# Patient Record
Sex: FEMALE | Race: ASIAN
[De-identification: names, ages, dates, MRNs, and addresses within clinical notes are randomized per-mention and may not be internally consistent; named-entity substitution may affect disease eponyms.]

---

## 2018-02-28 ENCOUNTER — HOSPITAL ENCOUNTER (OUTPATIENT)
Dept: HOSPITAL 92 - BICRAD | Age: 78
Discharge: HOME | End: 2018-02-28
Attending: INTERNAL MEDICINE
Payer: MEDICARE

## 2018-02-28 DIAGNOSIS — R05: Primary | ICD-10-CM

## 2018-02-28 PROCEDURE — 71046 X-RAY EXAM CHEST 2 VIEWS: CPT

## 2018-03-04 ENCOUNTER — HOSPITAL ENCOUNTER (OUTPATIENT)
Dept: HOSPITAL 92 - ERS | Age: 78
Setting detail: OBSERVATION
LOS: 3 days | Discharge: NURSING, REHAB OR LONG-TERM CARE | End: 2018-03-07
Attending: INTERNAL MEDICINE | Admitting: INTERNAL MEDICINE
Payer: MEDICARE

## 2018-03-04 VITALS — BODY MASS INDEX: 15 KG/M2

## 2018-03-04 DIAGNOSIS — E87.6: ICD-10-CM

## 2018-03-04 DIAGNOSIS — M62.82: Primary | ICD-10-CM

## 2018-03-04 DIAGNOSIS — D72.829: ICD-10-CM

## 2018-03-04 DIAGNOSIS — Z98.890: ICD-10-CM

## 2018-03-04 DIAGNOSIS — E78.5: ICD-10-CM

## 2018-03-04 DIAGNOSIS — E44.0: ICD-10-CM

## 2018-03-04 DIAGNOSIS — H40.9: ICD-10-CM

## 2018-03-04 DIAGNOSIS — Z79.899: ICD-10-CM

## 2018-03-04 LAB
ALBUMIN SERPL BCG-MCNC: 4 G/DL (ref 3.4–4.8)
ALP SERPL-CCNC: 72 U/L (ref 40–150)
ALT SERPL W P-5'-P-CCNC: 29 U/L (ref 8–55)
ANION GAP SERPL CALC-SCNC: 15 MMOL/L (ref 10–20)
AST SERPL-CCNC: 51 U/L (ref 5–34)
BASOPHILS # BLD AUTO: 0 THOU/UL (ref 0–0.2)
BASOPHILS NFR BLD AUTO: 0.5 % (ref 0–1)
BILIRUB SERPL-MCNC: 1.6 MG/DL (ref 0.2–1.2)
BUN SERPL-MCNC: 19 MG/DL (ref 9.8–20.1)
CALCIUM SERPL-MCNC: 9.7 MG/DL (ref 7.8–10.44)
CHLORIDE SERPL-SCNC: 90 MMOL/L (ref 98–107)
CK SERPL-CCNC: 917 U/L (ref 29–168)
CO2 SERPL-SCNC: 29 MMOL/L (ref 23–31)
CREAT CL PREDICTED SERPL C-G-VRATE: 0 ML/MIN (ref 70–130)
CRP SERPL-MCNC: 10.37 MG/DL
EOSINOPHIL # BLD AUTO: 0 THOU/UL (ref 0–0.7)
EOSINOPHIL NFR BLD AUTO: 0.1 % (ref 0–10)
GLOBULIN SER CALC-MCNC: 2.9 G/DL (ref 2.4–3.5)
GLUCOSE SERPL-MCNC: 121 MG/DL (ref 83–110)
HGB BLD-MCNC: 13.5 G/DL (ref 12–16)
HYALINE CASTS #/AREA URNS LPF: (no result) LPF
LYMPHOCYTES # BLD: 0.5 THOU/UL (ref 1.2–3.4)
LYMPHOCYTES NFR BLD AUTO: 6.1 % (ref 21–51)
MCH RBC QN AUTO: 31.5 PG (ref 27–31)
MCV RBC AUTO: 93.2 FL (ref 81–99)
MONOCYTES # BLD AUTO: 0.6 THOU/UL (ref 0.11–0.59)
MONOCYTES NFR BLD AUTO: 6.4 % (ref 0–10)
NEUTROPHILS # BLD AUTO: 7.7 THOU/UL (ref 1.4–6.5)
NEUTROPHILS NFR BLD AUTO: 87 % (ref 42–75)
PLATELET # BLD AUTO: 174 THOU/UL (ref 130–400)
POTASSIUM SERPL-SCNC: 3.8 MMOL/L (ref 3.5–5.1)
PROT UR STRIP.AUTO-MCNC: 30 MG/DL
RBC # BLD AUTO: 4.28 MILL/UL (ref 4.2–5.4)
SODIUM SERPL-SCNC: 130 MMOL/L (ref 136–145)
SP GR UR STRIP: 1.01 (ref 1–1.04)
WBC # BLD AUTO: 8.8 THOU/UL (ref 4.8–10.8)

## 2018-03-04 PROCEDURE — 80048 BASIC METABOLIC PNL TOTAL CA: CPT

## 2018-03-04 PROCEDURE — 86140 C-REACTIVE PROTEIN: CPT

## 2018-03-04 PROCEDURE — 94640 AIRWAY INHALATION TREATMENT: CPT

## 2018-03-04 PROCEDURE — 96361 HYDRATE IV INFUSION ADD-ON: CPT

## 2018-03-04 PROCEDURE — 51701 INSERT BLADDER CATHETER: CPT

## 2018-03-04 PROCEDURE — G0378 HOSPITAL OBSERVATION PER HR: HCPCS

## 2018-03-04 PROCEDURE — 96365 THER/PROPH/DIAG IV INF INIT: CPT

## 2018-03-04 PROCEDURE — 93970 EXTREMITY STUDY: CPT

## 2018-03-04 PROCEDURE — 83605 ASSAY OF LACTIC ACID: CPT

## 2018-03-04 PROCEDURE — 87040 BLOOD CULTURE FOR BACTERIA: CPT

## 2018-03-04 PROCEDURE — 72170 X-RAY EXAM OF PELVIS: CPT

## 2018-03-04 PROCEDURE — 97530 THERAPEUTIC ACTIVITIES: CPT

## 2018-03-04 PROCEDURE — 71045 X-RAY EXAM CHEST 1 VIEW: CPT

## 2018-03-04 PROCEDURE — 99285 EMERGENCY DEPT VISIT HI MDM: CPT

## 2018-03-04 PROCEDURE — 90682 RIV4 VACC RECOMBINANT DNA IM: CPT

## 2018-03-04 PROCEDURE — 82550 ASSAY OF CK (CPK): CPT

## 2018-03-04 PROCEDURE — 83735 ASSAY OF MAGNESIUM: CPT

## 2018-03-04 PROCEDURE — 87086 URINE CULTURE/COLONY COUNT: CPT

## 2018-03-04 PROCEDURE — 90471 IMMUNIZATION ADMIN: CPT

## 2018-03-04 PROCEDURE — 97139 UNLISTED THERAPEUTIC PX: CPT

## 2018-03-04 PROCEDURE — 96374 THER/PROPH/DIAG INJ IV PUSH: CPT

## 2018-03-04 PROCEDURE — A4216 STERILE WATER/SALINE, 10 ML: HCPCS

## 2018-03-04 PROCEDURE — 36415 COLL VENOUS BLD VENIPUNCTURE: CPT

## 2018-03-04 PROCEDURE — 96375 TX/PRO/DX INJ NEW DRUG ADDON: CPT

## 2018-03-04 PROCEDURE — 97116 GAIT TRAINING THERAPY: CPT

## 2018-03-04 PROCEDURE — 85652 RBC SED RATE AUTOMATED: CPT

## 2018-03-04 PROCEDURE — 96376 TX/PRO/DX INJ SAME DRUG ADON: CPT

## 2018-03-04 PROCEDURE — 85025 COMPLETE CBC W/AUTO DIFF WBC: CPT

## 2018-03-04 PROCEDURE — 81003 URINALYSIS AUTO W/O SCOPE: CPT

## 2018-03-04 PROCEDURE — 80053 COMPREHEN METABOLIC PANEL: CPT

## 2018-03-04 PROCEDURE — 84443 ASSAY THYROID STIM HORMONE: CPT

## 2018-03-04 PROCEDURE — 81015 MICROSCOPIC EXAM OF URINE: CPT

## 2018-03-04 PROCEDURE — A4353 INTERMITTENT URINARY CATH: HCPCS

## 2018-03-04 PROCEDURE — G0008 ADMIN INFLUENZA VIRUS VAC: HCPCS

## 2018-03-04 RX ADMIN — HYDROCODONE BITARTRATE AND ACETAMINOPHEN PRN TAB: 5; 325 TABLET ORAL at 19:17

## 2018-03-04 NOTE — RAD
AP VIEW OF THE CHEST:

 

INDICATION: 

Flu-like symptoms with leg pain.

 

COMPARISON: 

None.

 

FINDINGS: 

No airspace consolidation is evident.  There is moderate COPD change.  There is chondrocalcinosis ove
rlying the anterior chest wall.  Heart size is upper limits of normal.  There are vascular calcificat
ions in the aortic arch.  No acute osseous abnormality is evident.

 

IMPRESSION: 

No acute abnormality.

 

POS: [facility]

## 2018-03-04 NOTE — ULT
ULTRASOUND WITH DOPPLER DUPLEX VENOUS LOWER EXTREMITIES BILATERAL:

 

HISTORY: 

A 78-year-old female with bilateral lower extremity pain.

 

TECHNIQUE:

Color flow Doppler, spectral waveform analysis of pulsed Doppler, and gray-scale imaging with le
maria elena and augmentation, were used to evaluate the bilateral common femoral, femoral, popliteal, poster
ior tibial, and superficial femoral, veins; and the proximal portions of the profunda femoral and gre
ater saphenous, veins.

 

FINDINGS:

There is normal compressibility, demonstration of blood flow by color Doppler and pulsed Doppler, and
 response to augmentation, in all interrogated veins.

 

IMPRESSION:

Negative.  No deep vein thrombosis in the bilateral lower extremities.

 

 

jn[]

 

POS: [facility]

## 2018-03-04 NOTE — HP
DATE OF ADMISSION:  03/04/2018

 

PRIMARY CARE PHYSICIAN:  Dr. Guera [place].

 

CHIEF COMPLAINT:  Muscle aches, worsening, mostly in legs.

 

HISTORY OF PRESENTING ILLNESS:  Ms. Hernandez is a very pleasant 78-year-old [place]ese female with past medic
al history of dyslipidemia, currently on statin, who presented to the ER with the above-mentioned com
plaints.  History is mainly obtained by the patient herself who is a rather poor historian.  Suppleme
nted by the ER physician.

 

According to the ER physician; patient has been having some flu-like illnesses earlier this week and 
went to see her primary care physician.  She did not receive any Tamiflu for unclear reason; likely b
ecause she was out of the window of the treatment.  Then, 2 days ago, she started to develop bilatera
l lower extremity and hip pain which has gotten worse.  She lives by herself and is having difficulty
 getting around in the house to take care of her ADLs and IADLs.  She otherwise denies any nausea, vo
miting, diarrhea.  She does have some urinary difficulties and has been having some dry cough.  She d
enies any fever or chills.  She denies any injury.  She denies any changes in her medication except f
or the fact that she has not taken her statin for the last 2 days.

 

Upon presentation to the emergency room, she was hemodynamically stable with blood pressure of 158/91
, pulse of 95, saturating 96% on room air and afebrile.  Her workup revealed elevated CPK of over 900
 and elevated C-reactive protein of 10.  Her urine has moderate blood, rbc's, trace ketones and some 
protein.  She was treated with IV pain medication as well as IV fluids.  She underwent a lower extrem
ity ultrasound which is negative for any DVT.  She underwent x-ray of the pelvis, which is negative f
or any osseous abnormality.  She is now being admitted under observation status on medical floor for 
rhabdomyolysis.

 

PAST MEDICAL HISTORY:

1.  Dyslipidemia.

2.  Glaucoma.

 

PAST SURGICAL HISTORY:

1.  Mass removed from intestines.

2.  Appendectomy.

3.  Left hip surgery.

 

PSYCHIATRIC HISTORY:  No anxiety, no depression.

 

SOCIAL HISTORY:  No history of drug, tobacco or alcohol abuse.  She currently lives alone and has no 
immediate family locally.

 

FAMILY HISTORY:  Significant for unknown type of cancer in her father.  Her mother was relatively hea
lthy.  She denies any family history of heart disease, stroke, diabetes or hypertension.

 

ALLERGIES:  No known current allergies.

 

CURRENT MEDICATIONS:  Pravastatin 5 mg daily and Alphagan eyedrops twice a day.

 

REVIEW OF SYSTEMS:  The following complete review of systems was negative, unless otherwise mentioned
 in the HPI or below:

Constitutional:  Weight loss or gain, ability to conduct usual activities.

Skin:  Rash, itching.

Eyes:  Double vision, pain.

ENT/Mouth:  Nose bleeding, neck stiffness, pain, tenderness.

Cardiovascular:  Palpitations, dyspnea on exertion, orthopnea.

Respiratory:  Shortness of breath, wheezing, cough, hemoptysis, fever or night sweats.

Gastrointestinal:  Poor appetite, abdominal pain, heartburn, nausea, vomiting, constipation, or diarr
hea.

Genitourinary:  Urgency, frequency, dysuria, nocturia.

Musculoskeletal:  Pain, swelling.

Neurologic/Psychiatric:  Anxiety, depression.

Allergy/Immunologic:  Skin rash, bleeding tendency.

 

She is still hurting in her leg and complains of generalized weakness.  It is negative for those exce
pt mentioned in HPI.

 

PHYSICAL EXAMINATION:

VITAL SIGNS:  Upon presentation, blood pressure 158/91, pulse of 95, respirations 20, temperature 98.
2, and saturating 96% on the room air.

GENERAL:  She is awake, alert, oriented x3, no acute distress.  She does appear tired, weak, and very
 thin.

HEENT:  Mucous membrane is moist and pink.  No oropharyngeal exudate or erythema.  Head is normocepha
lic are atraumatic.  Pupils are equal and reactive to light and accommodation.  Extraocular movement 
intact.

NECK:  Supple without any lymphadenopathy, JVD or bruit.

CHEST:  Clear to auscultation without any wheezing, rales or rhonchi.  Rate and rhythm is regular wit
hout any murmur, rubs or gallops.

ABDOMEN:  Soft, nontender, nondistended with positive bowel sounds.

EXTREMITIES:  Free of any cyanosis, clubbing, or edema.

NEUROLOGIC:  Examination is nonfocal.

SKIN:  Free of any rashes or bruises.  I feel warm and dry to touch.

PSYCHIATRIC:  She has somewhat depressed affect.

 

LABORATORY DATA AND IMAGING DATA:

1.  Her CBC shows 87% neutrophils.  ESR is normal at 11.  Serum chemistry shows sodium of 130, chlori
de 90, blood sugar 121, lactic acid 1.2, total bilirubin 1.6, AST 51 with normal ALT and alkaline bhargavi
sphatase.  Creatinine kinase 917.  C-reactive protein is 10.37.  Urinalysis showed proteinuria, keton
uria and some microscopic hematuria.

2.  Chest x-ray by my review has no evidence to suggest pulmonary effusion, edema or infiltrate.  Low
er extremity ultrasound is negative for any DVT bilaterally.  Pelvic x-ray is free of any osseous abn
ormalities.

 

IMPRESSION AND PLAN:

1.  Acute rhabdomyolysis, this is likely exacerbated by current illness and the fact that the patient
 has been on statin.  I have instructed her to stop the statins for now and discuss the treatment of 
dyslipidemia with her primary care physician.  At this time, she will be treated with generous IV flu
ids and we will recheck CPK in the morning.  At this time, she has no evidence to suggest renal failu
re.  She will be treated symptomatically and supportively.  We will also check a TSH to rule out othe
r etiology.

2.  Leukocytosis.  This is likely reactive as there is no increase in the total WBC count.  Her urine
 does have some hematuria, but no clear evidence of infection.  There is no indication for antibiotic
s at this time.

3.  Malnutrition.  The patient does appear somewhat cachectic.  We will start her on Ensure 3 times a
 day and consult the dietitian for dietary recommendations.

4.  History of dyslipidemia.  Once again, I have instructed the patient not to use statins in the ramona
r future without discussion with the primary care physician.  If she does indeed need the statin, pra
vastatin or fluvastatin will be a safer choice for her.

5.  Code status:  FULL CODE.  Discussed with the patient.

 

DISPOSITION:  Ms. Hernandez is currently being admitted to medical floor for acute rhabdomyolysis.  Estimate
d length of stay at this time is less than 2 midnights.  Further management will depend upon her clin
ical course.

## 2018-03-05 LAB
ANION GAP SERPL CALC-SCNC: 10 MMOL/L (ref 10–20)
BASOPHILS # BLD AUTO: 0 THOU/UL (ref 0–0.2)
BASOPHILS NFR BLD AUTO: 0.2 % (ref 0–1)
BUN SERPL-MCNC: 14 MG/DL (ref 9.8–20.1)
CALCIUM SERPL-MCNC: 8.1 MG/DL (ref 7.8–10.44)
CHLORIDE SERPL-SCNC: 101 MMOL/L (ref 98–107)
CK SERPL-CCNC: 282 U/L (ref 29–168)
CO2 SERPL-SCNC: 26 MMOL/L (ref 23–31)
CREAT CL PREDICTED SERPL C-G-VRATE: 51 ML/MIN (ref 70–130)
EOSINOPHIL # BLD AUTO: 0 THOU/UL (ref 0–0.7)
EOSINOPHIL NFR BLD AUTO: 0.6 % (ref 0–10)
GLUCOSE SERPL-MCNC: 105 MG/DL (ref 83–110)
HGB BLD-MCNC: 11 G/DL (ref 12–16)
LYMPHOCYTES # BLD: 1 THOU/UL (ref 1.2–3.4)
LYMPHOCYTES NFR BLD AUTO: 15.5 % (ref 21–51)
MCH RBC QN AUTO: 31.6 PG (ref 27–31)
MCV RBC AUTO: 94.1 FL (ref 81–99)
MONOCYTES # BLD AUTO: 0.8 THOU/UL (ref 0.11–0.59)
MONOCYTES NFR BLD AUTO: 11.6 % (ref 0–10)
NEUTROPHILS # BLD AUTO: 4.6 THOU/UL (ref 1.4–6.5)
NEUTROPHILS NFR BLD AUTO: 72.1 % (ref 42–75)
PLATELET # BLD AUTO: 161 THOU/UL (ref 130–400)
POTASSIUM SERPL-SCNC: 3 MMOL/L (ref 3.5–5.1)
RBC # BLD AUTO: 3.49 MILL/UL (ref 4.2–5.4)
SODIUM SERPL-SCNC: 134 MMOL/L (ref 136–145)
WBC # BLD AUTO: 6.4 THOU/UL (ref 4.8–10.8)

## 2018-03-05 RX ADMIN — Medication SCH: at 21:17

## 2018-03-05 RX ADMIN — MORPHINE SULFATE PRN MG: 5 INJECTION, SOLUTION INTRAMUSCULAR; INTRAVENOUS at 15:45

## 2018-03-05 RX ADMIN — HYDROCODONE BITARTRATE AND ACETAMINOPHEN PRN TAB: 5; 325 TABLET ORAL at 05:56

## 2018-03-05 RX ADMIN — HYDROCODONE BITARTRATE AND ACETAMINOPHEN PRN TAB: 5; 325 TABLET ORAL at 21:15

## 2018-03-05 RX ADMIN — HYDROCODONE BITARTRATE AND ACETAMINOPHEN PRN TAB: 5; 325 TABLET ORAL at 10:52

## 2018-03-05 RX ADMIN — MORPHINE SULFATE PRN MG: 5 INJECTION, SOLUTION INTRAMUSCULAR; INTRAVENOUS at 20:14

## 2018-03-05 NOTE — PDOC.PN
- Subjective


Encounter Start Date: 03/05/18


Encounter Start Time: 13:43


Subjective: still c/o svere pain in both legs and back ,not moving at all





- Objective


MAR Reviewed: Yes


Vital Signs & Weight: 


 Vital Signs (12 hours)











  Temp Pulse Resp BP Pulse Ox


 


 03/05/18 11:24  98.6 F  74  18  149/78 H  96


 


 03/05/18 08:00  98.6 F  74  18   93 L


 


 03/05/18 07:41  98.1 F  80  16  162/82 H  93 L


 


 03/05/18 04:00  98.5 F  75  18  135/72  94 L








 Weight











Admit Weight                   87 lb 12.48 oz


 


Weight                         87 lb 12.48 oz














I&O: 


 











 03/04/18 03/05/18 03/06/18





 06:59 06:59 06:59


 


Intake Total  4880 


 


Balance  4880 











Result Diagrams: 


 03/05/18 04:20





 03/05/18 04:20


Additional Labs: 





Microbiology





03/04/18 10:38   Urine Straight Catheter   Urine Culture - Preliminary


                              NO GROWTH AT 24 HOURS


03/04/18 10:27   Venous blood - Right Arm   Blood Culture - Preliminary


                              Specimen has been received and culture in 

progress.


                              No Growth to date.


03/04/18 10:27   Venous blood - Left Arm   Blood Culture - Preliminary


                              Specimen has been received and culture in 

progress.


                              No Growth to date.





 Laboratory Tests











  03/04/18 03/04/18 03/05/18





  10:35 10:35 04:20


 


Creatine Kinase  917 H   282 H


 


C-Reactive Protein  10.37 H  


 


TSH 3rd Generation   0.7876 














  03/05/18





  04:20


 


Creatine Kinase 


 


C-Reactive Protein  6.77 H


 


TSH 3rd Generation 














Phys Exam





- Physical Examination


very uncomfortable due to pain.cachectic


HEENT: PERRLA, moist MMs, sclera anicteric, TM's clear, oral pharynx no lesions

, 2+ tonsils


Neck: no nodes, no JVD, supple, full ROM


Respiratory: no wheezing, no rales, no rhonchi, clear to auscultation bilateral


Cardiovascular: RRR, no significant murmur, no rub, gallop


Gastrointestinal: soft, non-tender, no distention, positive bowel sounds


Musculoskeletal: no edema, pulses present


Neurological: non-focal, normal sensation, moves all 4 limbs


no erythema,warmth,swelling in leg.non tarumatic


Psychiatric: normal affect, A&O x 3


Skin: no rash





Dx/Plan


(1) Rhabdomyolysis


Code(s): M62.82 - RHABDOMYOLYSIS   Status: Acute   


Qualifiers: 


   Rhabdomyolysis type: non-traumatic   Qualified Code(s): M62.82 - 

Rhabdomyolysis   





(2) Hypokalemia


Code(s): E87.6 - HYPOKALEMIA   Status: Acute   





(3) Pain


Code(s): R52 - PAIN, UNSPECIFIED   Status: Acute   





(4) HLD (hyperlipidemia)


Code(s): E78.5 - HYPERLIPIDEMIA, UNSPECIFIED   Status: Chronic   





(5) Moderate protein-calorie malnutrition


Code(s): E44.0 - MODERATE PROTEIN-CALORIE MALNUTRITION   Status: Chronic   





- Plan


PT/OT, out of bed/ambulate, DVT proph w/SCDs


CPK & CRP both trending down w IVF.cont NS.add prn morphine


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


-: pt unable to go home d/t uncontrolled pain.will have rehab eval


-: lives alone and high risk of re-admit as she won't be able to take care of


-: herself.encouraged to move to prevent further muscle damage





* .recheck CPK in am


* replace and recheck potassium


* Ensure TID.consult dietitian








Review of Systems





- Review of Systems


Constitutional: weakness, malaise


ENT: negative: Ear Pain, Ear Discharge, Nose Pain, Nose Discharge, Nose 

Congestion, Mouth Pain, Mouth Swelling, Throat Pain, Throat Swelling, Other


Respiratory: negative: Cough, Dry, Shortness of Breath, Hemoptysis, SOB with 

Excertion, Pleuritic Pain, Sputum, Wheezing


Cardiovascular: negative: chest pain, palpitations, orthopnea, paroxysmal 

nocturnal dyspnea, edema, light headedness, other


Gastrointestinal: negative: Nausea, Vomiting, Abdominal Pain, Diarrhea, 

Constipation, Melena, Hematochezia, Other


Genitourinary: negative: Dysuria, Frequency, Incontinence, Hematuria, Retention

, Other


Musculoskeletal: Leg Pain.  negative: Neck Pain, Shoulder Pain, Arm Pain, Back 

Pain, Hand Pain, Foot Pain, Other


Neurological: negative: Weakness, Numbness, Incoordination, Change in Speech, 

Confusion, Seizures, Other





- Medications/Allergies


Allergies/Adverse Reactions: 


 Allergies











Allergy/AdvReac Type Severity Reaction Status Date / Time


 


No Known Allergies Allergy   Unverified 03/04/18 15:23











Medications: 


 Current Medications





Acetaminophen (Tylenol)  650 mg PO Q4H PRN


   PRN Reason: Headache/Fever or Pain


Hydrocodone Bitart/Acetaminophen (Norco 5/325)  1 tab PO Q4H PRN


   PRN Reason: Moderate Pain (4-6)


   Last Admin: 03/05/18 10:52 Dose:  1 tab


Al Hydroxide/Mg Hydroxide (Maalox)  30 ml PO Q6H PRN


   PRN Reason: Heartburn  or Indigestion


Benzonatate (Tessalon)  100 mg PO Q4H PRN


   PRN Reason: Cough


Bisacodyl (Dulcolax)  10 mg PO DAILYPRN PRN


   PRN Reason: Constipation


Bisacodyl (Dulcolax)  10 mg PO DAILYPRN PRN


   PRN Reason: Constipation


Calcium Carbonate (Tums)  1,000 mg PO Q4H PRN


   PRN Reason: Heartburn  or Indigestion


Clonidine (Catapres)  0.1 mg PO Q4H PRN


   PRN Reason: Systolic BP > 160


Guaifenesin (Robitussin Sf)  200 mg PO Q4H PRN


   PRN Reason: Cough


Hydralazine HCl (Apresoline)  10 mg SLOW IVP Q4H PRN


   PRN Reason: Systolic BP > 170


Sodium Chloride (Normal Saline 0.9%)  1,000 mls @ 200 mls/hr IV .Q5H CECE


   Last Admin: 03/05/18 10:54 Dose:  1,000 mls


Loratadine (Claritin)  10 mg PO DAILYPRN PRN


   PRN Reason: Sinus Symptoms


Lorazepam (Ativan)  1 mg PO Q4H PRN


   PRN Reason: Anxiety/Agitation


Nitroglycerin (Nitrostat)  0.4 mg SL Q5MIN PRN


   PRN Reason: Chest Pain


Ondansetron HCl (Zofran)  4 mg IVP Q6H PRN


   PRN Reason: Nausea/Vomiting


Senna (Senokot)  2 tab PO HSPRN PRN


   PRN Reason: Constipation


Senna (Senokot)  2 tab PO HSPRN PRN


   PRN Reason: Constipation


Sodium Chloride (Flush - Normal Saline)  10 ml IVF Q12HR CECE


Sodium Chloride (Flush - Normal Saline)  10 ml IVF PRN PRN


   PRN Reason: Saline Flush


Tramadol HCl (Ultram)  50 mg PO Q4H PRN


   PRN Reason: Moderate Pain (4-6)


   Last Admin: 03/05/18 08:00 Dose:  50 mg

## 2018-03-06 LAB
ANION GAP SERPL CALC-SCNC: 10 MMOL/L (ref 10–20)
ANION GAP SERPL CALC-SCNC: 8 MMOL/L (ref 10–20)
BUN SERPL-MCNC: 7 MG/DL (ref 9.8–20.1)
BUN SERPL-MCNC: 9 MG/DL (ref 9.8–20.1)
CALCIUM SERPL-MCNC: 8.1 MG/DL (ref 7.8–10.44)
CALCIUM SERPL-MCNC: 8.4 MG/DL (ref 7.8–10.44)
CHLORIDE SERPL-SCNC: 97 MMOL/L (ref 98–107)
CHLORIDE SERPL-SCNC: 99 MMOL/L (ref 98–107)
CK SERPL-CCNC: 143 U/L (ref 29–168)
CO2 SERPL-SCNC: 30 MMOL/L (ref 23–31)
CO2 SERPL-SCNC: 32 MMOL/L (ref 23–31)
CREAT CL PREDICTED SERPL C-G-VRATE: 50 ML/MIN (ref 70–130)
CREAT CL PREDICTED SERPL C-G-VRATE: 53 ML/MIN (ref 70–130)
GLUCOSE SERPL-MCNC: 109 MG/DL (ref 83–110)
GLUCOSE SERPL-MCNC: 123 MG/DL (ref 83–110)
POTASSIUM SERPL-SCNC: 2.8 MMOL/L (ref 3.5–5.1)
POTASSIUM SERPL-SCNC: 3.3 MMOL/L (ref 3.5–5.1)
SODIUM SERPL-SCNC: 134 MMOL/L (ref 136–145)
SODIUM SERPL-SCNC: 136 MMOL/L (ref 136–145)

## 2018-03-06 RX ADMIN — HYDROCODONE BITARTRATE AND ACETAMINOPHEN PRN TAB: 5; 325 TABLET ORAL at 20:55

## 2018-03-06 RX ADMIN — MORPHINE SULFATE PRN MG: 5 INJECTION, SOLUTION INTRAMUSCULAR; INTRAVENOUS at 18:15

## 2018-03-06 RX ADMIN — HYDROCODONE BITARTRATE AND ACETAMINOPHEN PRN TAB: 5; 325 TABLET ORAL at 13:45

## 2018-03-06 RX ADMIN — HYDROCODONE BITARTRATE AND ACETAMINOPHEN PRN TAB: 5; 325 TABLET ORAL at 04:54

## 2018-03-06 RX ADMIN — BRIMONIDINE TARTRATE SCH: 2 SOLUTION OPHTHALMIC at 20:40

## 2018-03-06 RX ADMIN — MORPHINE SULFATE PRN MG: 5 INJECTION, SOLUTION INTRAMUSCULAR; INTRAVENOUS at 09:06

## 2018-03-06 RX ADMIN — Medication SCH ML: at 20:41

## 2018-03-06 RX ADMIN — Medication SCH ML: at 09:14

## 2018-03-06 RX ADMIN — MORPHINE SULFATE PRN MG: 5 INJECTION, SOLUTION INTRAMUSCULAR; INTRAVENOUS at 22:16

## 2018-03-06 NOTE — PDOC.PN
- Subjective


Encounter Start Date: 03/06/18


Encounter Start Time: 12:45


Subjective: a liitle better but only when uses morphine


-: says that moving ,even in bed is painful.back hurts & legs hurt





- Objective


MAR Reviewed: Yes


Vital Signs & Weight: 


 Vital Signs (12 hours)











  Temp Pulse Pulse Pulse Resp BP BP


 


 03/06/18 10:45    87  91   159/83 H  129/76


 


 03/06/18 07:25  99.0 F  87    18  


 


 03/06/18 05:37  98.6 F  70    18  














  BP Pulse Ox Pulse Ox Pulse Ox


 


 03/06/18 10:45    96  90 L


 


 03/06/18 07:25  165/86 H  95  


 


 03/06/18 05:37  145/74 H  94 L  








 Weight











Admit Weight                   87 lb 12.48 oz


 


Weight                         87 lb 12.48 oz














I&O: 


 











 03/05/18 03/06/18 03/07/18





 06:59 06:59 06:59


 


Intake Total 4880 240 


 


Output Total  4500 


 


Balance 4880 -4260 











Result Diagrams: 


 03/05/18 04:20





 03/06/18 04:28


Additional Labs: 





Microbiology





03/04/18 10:38   Urine Straight Catheter   Urine Culture - Final


                              NO GROWTH AT 48 HOURS


03/04/18 10:27   Venous blood - Right Arm   Blood Culture - Preliminary


                              NO GROWTH AT 48 HOURS


03/04/18 10:27   Venous blood - Left Arm   Blood Culture - Preliminary


                              NO GROWTH AT 48 HOURS





 Laboratory Tests











  03/04/18 03/05/18 03/05/18





  10:35 04:20 04:20


 


Magnesium   


 


Creatine Kinase  917 H  282 H 


 


C-Reactive Protein  10.37 H   6.77 H














  03/06/18 03/06/18 03/06/18





  04:28 04:28 04:28


 


Magnesium    1.5 L


 


Creatine Kinase  143  


 


C-Reactive Protein   3.65 H 














Phys Exam





- Physical Examination


Constitutional: NAD


more comfortable than yesterday


HEENT: PERRLA, moist MMs, sclera anicteric, oral pharynx no lesions


Neck: no nodes, no JVD, supple, full ROM


Respiratory: no wheezing, no rales, no rhonchi, clear to auscultation bilateral


Cardiovascular: RRR, no significant murmur


Gastrointestinal: soft, non-tender, no distention, positive bowel sounds


Musculoskeletal: no edema, pulses present


no swelling,erythema,tenderness in legs.moving against gravity


Neurological: non-focal, normal sensation, moves all 4 limbs


Psychiatric: normal affect, A&O x 3


Skin: no rash





Dx/Plan


(1) Rhabdomyolysis


Code(s): M62.82 - RHABDOMYOLYSIS   Status: Acute   


Qualifiers: 


   Rhabdomyolysis type: non-traumatic   Qualified Code(s): M62.82 - 

Rhabdomyolysis   





(2) Hypokalemia


Code(s): E87.6 - HYPOKALEMIA   Status: Acute   





(3) Pain


Code(s): R52 - PAIN, UNSPECIFIED   Status: Acute   





(4) HLD (hyperlipidemia)


Code(s): E78.5 - HYPERLIPIDEMIA, UNSPECIFIED   Status: Chronic   





(5) Moderate protein-calorie malnutrition


Code(s): E44.0 - MODERATE PROTEIN-CALORIE MALNUTRITION   Status: Chronic   





- Plan


PT/OT, out of bed/ambulate


CPK Normalised.CRP trending down.reduce IVF.encouraged for PO intake


-: replace & recheck Potassium & mag.


-: awaitin Rehab eval per her request.lives alone & can not go home


-: not moving out of bed so far due to pain


-: am labs.Statin stopped





* .








Review of Systems





- Review of Systems


Constitutional: weakness, malaise.  negative: fever, chills, sweats, other


Eyes: negative: Pain, Vision Change, Conjunctivae Inflammation, Eyelid 

Inflammation, Redness, Other


ENT: negative: Ear Pain, Ear Discharge, Nose Pain, Nose Discharge, Nose 

Congestion, Mouth Pain, Mouth Swelling, Throat Pain, Throat Swelling, Other


Respiratory: negative: Cough, Dry, Shortness of Breath, Hemoptysis, SOB with 

Excertion, Pleuritic Pain, Sputum, Wheezing


Cardiovascular: negative: chest pain, palpitations, orthopnea, paroxysmal 

nocturnal dyspnea, edema, light headedness, other


Gastrointestinal: negative: Nausea, Vomiting, Abdominal Pain, Diarrhea, 

Constipation, Melena, Hematochezia, Other


Genitourinary: negative: Dysuria, Frequency, Incontinence, Hematuria, Retention

, Other


Musculoskeletal: Back Pain, Leg Pain.  negative: Neck Pain, Shoulder Pain, Arm 

Pain, Hand Pain, Foot Pain, Other


Skin: negative: Rash, Lesions, Jamie, Bruising, Other


Neurological: negative: Weakness, Numbness, Incoordination, Change in Speech, 

Confusion, Seizures, Other





- Medications/Allergies


Allergies/Adverse Reactions: 


 Allergies











Allergy/AdvReac Type Severity Reaction Status Date / Time


 


No Known Allergies Allergy   Unverified 03/04/18 15:23











Medications: 


 Current Medications





Acetaminophen (Tylenol)  650 mg PO Q4H PRN


   PRN Reason: Headache/Fever or Pain


Hydrocodone Bitart/Acetaminophen (Norco 5/325)  1 tab PO Q4H PRN


   PRN Reason: Moderate Pain (4-6)


   Last Admin: 03/06/18 04:54 Dose:  1 tab


Al Hydroxide/Mg Hydroxide (Maalox)  30 ml PO Q6H PRN


   PRN Reason: Heartburn  or Indigestion


Benzonatate (Tessalon)  100 mg PO Q4H PRN


   PRN Reason: Cough


Bisacodyl (Dulcolax)  10 mg PO DAILYPRN PRN


   PRN Reason: Constipation


   Last Admin: 03/06/18 09:47 Dose:  10 mg


Bisacodyl (Dulcolax)  10 mg PO DAILYPRN PRN


   PRN Reason: Constipation


Brimonidine Tartrate (Alphagan 0.2% Ophth Soln)  1 drop L EYE BID CECE


Calcium Carbonate (Tums)  1,000 mg PO Q4H PRN


   PRN Reason: Heartburn  or Indigestion


Clonidine (Catapres)  0.1 mg PO Q4H PRN


   PRN Reason: Systolic BP > 160


Guaifenesin (Robitussin Sf)  200 mg PO Q4H PRN


   PRN Reason: Cough


Hydralazine HCl (Apresoline)  10 mg SLOW IVP Q4H PRN


   PRN Reason: Systolic BP > 170


Sodium Chloride (Normal Saline 0.9%)  1,000 mls @ 200 mls/hr IV .Q5H CECE


   Last Admin: 03/06/18 11:33 Dose:  1,000 mls


Magnesium Sulfate 3 gm/ Sodium (Chloride)  106 mls @ 100 mls/hr IVPB ONE CECE


Loratadine (Claritin)  10 mg PO DAILYPRN PRN


   PRN Reason: Sinus Symptoms


Lorazepam (Ativan)  1 mg PO Q4H PRN


   PRN Reason: Anxiety/Agitation


Morphine Sulfate (Morphine)  2 mg SLOW IVP Q4H PRN


   PRN Reason: severe pain


   Last Admin: 03/06/18 09:06 Dose:  2 mg


Nitroglycerin (Nitrostat)  0.4 mg SL Q5MIN PRN


   PRN Reason: Chest Pain


Ondansetron HCl (Zofran)  4 mg IVP Q6H PRN


   PRN Reason: Nausea/Vomiting


Senna (Senokot)  2 tab PO HSPRN PRN


   PRN Reason: Constipation


Senna (Senokot)  2 tab PO HSPRN PRN


   PRN Reason: Constipation


Sodium Chloride (Flush - Normal Saline)  10 ml IVF Q12HR CECE


   Last Admin: 03/06/18 09:14 Dose:  10 ml


Sodium Chloride (Flush - Normal Saline)  10 ml IVF PRN PRN


   PRN Reason: Saline Flush


Tramadol HCl (Ultram)  50 mg PO Q4H PRN


   PRN Reason: Moderate Pain (4-6)


   Last Admin: 03/06/18 06:25 Dose:  50 mg

## 2018-03-07 VITALS — TEMPERATURE: 99.7 F | SYSTOLIC BLOOD PRESSURE: 121 MMHG | DIASTOLIC BLOOD PRESSURE: 67 MMHG

## 2018-03-07 LAB
ANION GAP SERPL CALC-SCNC: 9 MMOL/L (ref 10–20)
BUN SERPL-MCNC: 13 MG/DL (ref 9.8–20.1)
CALCIUM SERPL-MCNC: 8.9 MG/DL (ref 7.8–10.44)
CHLORIDE SERPL-SCNC: 94 MMOL/L (ref 98–107)
CO2 SERPL-SCNC: 34 MMOL/L (ref 23–31)
CREAT CL PREDICTED SERPL C-G-VRATE: 53 ML/MIN (ref 70–130)
GLUCOSE SERPL-MCNC: 112 MG/DL (ref 83–110)
MAGNESIUM SERPL-MCNC: 2.1 MG/DL (ref 1.6–2.6)
POTASSIUM SERPL-SCNC: 3.3 MMOL/L (ref 3.5–5.1)
SODIUM SERPL-SCNC: 134 MMOL/L (ref 136–145)

## 2018-03-07 RX ADMIN — MORPHINE SULFATE PRN MG: 5 INJECTION, SOLUTION INTRAMUSCULAR; INTRAVENOUS at 04:04

## 2018-03-07 RX ADMIN — BRIMONIDINE TARTRATE SCH: 2 SOLUTION OPHTHALMIC at 08:57

## 2018-03-07 RX ADMIN — Medication SCH ML: at 08:57

## 2018-03-07 RX ADMIN — HYDROCODONE BITARTRATE AND ACETAMINOPHEN PRN TAB: 5; 325 TABLET ORAL at 09:13

## 2018-03-07 NOTE — DIS
PRIMARY CARE PHYSICIAN:   Dr. Guera [place]

 

DATE OF ADMISSION:  03/04/2018  

 

DATE OF DISCHARGE:  03/07/2018

 

DISCHARGE DIAGNOSES:  

1.  Rhabdomyolysis.  

2.  Physical deconditioning.

3.  Protein calorie malnutrition, moderate. 

 

CONDITION OF PATIENT ON THE DAY OF DISCHARGE:   I assessed Ms. Hernandez on the day of discharge.  She denie
s any chest pain or shortness of breath.  She reports generalized weakness.  She reports pain in her 
lower extremities.  Vital signs are stable.  S1 and S2 are heard, regular.  Lungs are clear to auscul
tation bilaterally

 

DISCHARGE MEDICATIONS:  Alphagan eyedrops 1 drop to left eye 2 times a day, Tylenol #3 one tablet jazmine
ry 6 hours as needed, tramadol 50 mg every 6 hours as needed.

 

HOSPITAL COURSE:  Ms. Hernandez is a pleasant 78-year-old lady who was admitted to [facility] on 03/04/2018 for rhabdomyolysis, most likely secondary to statin use.  She also had bodyach
es.  She was treated with intravenous fluids, with resolution of rhabdomyolysis.  She is physically d
econditioned and is being discharged to [facility] re[facility] for further management.

 

On the day of discharge, she has sodium 134, potassium 3.3, which is being replaced, creatinine 0.55 
and a carbon dioxide 34.  Her TSH during this hospitalization was normal.

 

Many thanks for allowing me to participate in your patient's care.  Please feel free to contact me wi
th any questions or concerns.

 

DISCHARGE DESTINATION:  [facility].

 

TOTAL AMOUNT OF TIME SPENT COORDINATING THIS DISCHARGE:  32 minutes.

## 2018-04-06 ENCOUNTER — HOSPITAL ENCOUNTER (OUTPATIENT)
Dept: HOSPITAL 92 - REHABRAD | Age: 78
Discharge: HOME | End: 2018-04-06
Attending: FAMILY MEDICINE
Payer: MEDICARE

## 2018-04-06 DIAGNOSIS — R63.3: ICD-10-CM

## 2018-04-06 DIAGNOSIS — R41.89: ICD-10-CM

## 2018-04-06 DIAGNOSIS — R13.12: Primary | ICD-10-CM

## 2018-04-06 PROCEDURE — 74230 X-RAY XM SWLNG FUNCJ C+: CPT

## 2018-04-08 ENCOUNTER — HOSPITAL ENCOUNTER (OUTPATIENT)
Dept: HOSPITAL 92 - ERS | Age: 78
Setting detail: OBSERVATION
LOS: 2 days | Discharge: NURSING, REHAB OR LONG-TERM CARE | End: 2018-04-10
Attending: INTERNAL MEDICINE | Admitting: INTERNAL MEDICINE
Payer: MEDICARE

## 2018-04-08 VITALS — BODY MASS INDEX: 15 KG/M2

## 2018-04-08 DIAGNOSIS — E78.5: ICD-10-CM

## 2018-04-08 DIAGNOSIS — B95.2: ICD-10-CM

## 2018-04-08 DIAGNOSIS — S32.10XA: ICD-10-CM

## 2018-04-08 DIAGNOSIS — E44.0: ICD-10-CM

## 2018-04-08 DIAGNOSIS — Z79.899: ICD-10-CM

## 2018-04-08 DIAGNOSIS — N39.0: ICD-10-CM

## 2018-04-08 DIAGNOSIS — Z98.890: ICD-10-CM

## 2018-04-08 DIAGNOSIS — G93.41: ICD-10-CM

## 2018-04-08 DIAGNOSIS — I10: ICD-10-CM

## 2018-04-08 DIAGNOSIS — M62.82: ICD-10-CM

## 2018-04-08 DIAGNOSIS — R07.89: Primary | ICD-10-CM

## 2018-04-08 DIAGNOSIS — E87.1: ICD-10-CM

## 2018-04-08 LAB
ALBUMIN SERPL BCG-MCNC: 3.9 G/DL (ref 3.4–4.8)
ALP SERPL-CCNC: 119 U/L (ref 40–150)
ALT SERPL W P-5'-P-CCNC: 27 U/L (ref 8–55)
ANION GAP SERPL CALC-SCNC: 11 MMOL/L (ref 10–20)
AST SERPL-CCNC: 18 U/L (ref 5–34)
BACTERIA UR QL AUTO: (no result) HPF
BASOPHILS # BLD AUTO: 0.1 THOU/UL (ref 0–0.2)
BASOPHILS NFR BLD AUTO: 0.7 % (ref 0–1)
BILIRUB SERPL-MCNC: 0.4 MG/DL (ref 0.2–1.2)
BUN SERPL-MCNC: 26 MG/DL (ref 9.8–20.1)
CALCIUM SERPL-MCNC: 9.4 MG/DL (ref 7.8–10.44)
CHD RISK SERPL-RTO: 4.3 (ref ?–4.5)
CHLORIDE SERPL-SCNC: 96 MMOL/L (ref 98–107)
CHOLEST SERPL-MCNC: 284 MG/DL
CK MB SERPL-MCNC: 0.6 NG/ML (ref 0–6.6)
CK SERPL-CCNC: 29 U/L (ref 29–168)
CO2 SERPL-SCNC: 30 MMOL/L (ref 23–31)
CREAT CL PREDICTED SERPL C-G-VRATE: 0 ML/MIN (ref 70–130)
CRYSTAL-AUWI FLAG: 1.4 (ref 0–15)
EOSINOPHIL # BLD AUTO: 0.1 THOU/UL (ref 0–0.7)
EOSINOPHIL NFR BLD AUTO: 1.4 % (ref 0–10)
GLOBULIN SER CALC-MCNC: 2.6 G/DL (ref 2.4–3.5)
GLUCOSE SERPL-MCNC: 113 MG/DL (ref 83–110)
HDLC SERPL-MCNC: 66 MG/DL
HEV IGM SER QL: 0 (ref 0–7.99)
HGB BLD-MCNC: 11.4 G/DL (ref 12–16)
HYALINE CASTS #/AREA URNS LPF: (no result) LPF
LDLC SERPL CALC-MCNC: 199 MG/DL
LYMPHOCYTES # BLD: 1.3 THOU/UL (ref 1.2–3.4)
LYMPHOCYTES NFR BLD AUTO: 17.1 % (ref 21–51)
MCH RBC QN AUTO: 32.9 PG (ref 27–31)
MCV RBC AUTO: 96.9 FL (ref 81–99)
MONOCYTES # BLD AUTO: 0.7 THOU/UL (ref 0.11–0.59)
MONOCYTES NFR BLD AUTO: 9.1 % (ref 0–10)
NEUTROPHILS # BLD AUTO: 5.5 THOU/UL (ref 1.4–6.5)
NEUTROPHILS NFR BLD AUTO: 71.7 % (ref 42–75)
PATHC CAST-AUWI FLAG: 1.45 (ref 0–2.49)
PLATELET # BLD AUTO: 280 THOU/UL (ref 130–400)
POTASSIUM SERPL-SCNC: 3.8 MMOL/L (ref 3.5–5.1)
RBC # BLD AUTO: 3.45 MILL/UL (ref 4.2–5.4)
SODIUM SERPL-SCNC: 133 MMOL/L (ref 136–145)
SP GR UR STRIP: 1.01 (ref 1–1.04)
SPERM-AUWI FLAG: 0 (ref 0–9.9)
TRIGL SERPL-MCNC: 96 MG/DL (ref ?–150)
TROPONIN I SERPL DL<=0.01 NG/ML-MCNC: (no result) NG/ML (ref ?–0.03)
TROPONIN I SERPL DL<=0.01 NG/ML-MCNC: 0.01 NG/ML (ref ?–0.03)
TROPONIN I SERPL DL<=0.01 NG/ML-MCNC: 0.02 NG/ML (ref ?–0.03)
WBC # BLD AUTO: 7.6 THOU/UL (ref 4.8–10.8)
YEAST-AUWI FLAG: 96.9 (ref 0–25)

## 2018-04-08 PROCEDURE — G0378 HOSPITAL OBSERVATION PER HR: HCPCS

## 2018-04-08 PROCEDURE — 80202 ASSAY OF VANCOMYCIN: CPT

## 2018-04-08 PROCEDURE — 94760 N-INVAS EAR/PLS OXIMETRY 1: CPT

## 2018-04-08 PROCEDURE — 85025 COMPLETE CBC W/AUTO DIFF WBC: CPT

## 2018-04-08 PROCEDURE — 70450 CT HEAD/BRAIN W/O DYE: CPT

## 2018-04-08 PROCEDURE — 36415 COLL VENOUS BLD VENIPUNCTURE: CPT

## 2018-04-08 PROCEDURE — 97116 GAIT TRAINING THERAPY: CPT

## 2018-04-08 PROCEDURE — 87077 CULTURE AEROBIC IDENTIFY: CPT

## 2018-04-08 PROCEDURE — 97530 THERAPEUTIC ACTIVITIES: CPT

## 2018-04-08 PROCEDURE — 96372 THER/PROPH/DIAG INJ SC/IM: CPT

## 2018-04-08 PROCEDURE — 87186 SC STD MICRODIL/AGAR DIL: CPT

## 2018-04-08 PROCEDURE — 82553 CREATINE MB FRACTION: CPT

## 2018-04-08 PROCEDURE — 71045 X-RAY EXAM CHEST 1 VIEW: CPT

## 2018-04-08 PROCEDURE — 96375 TX/PRO/DX INJ NEW DRUG ADDON: CPT

## 2018-04-08 PROCEDURE — 87086 URINE CULTURE/COLONY COUNT: CPT

## 2018-04-08 PROCEDURE — 80053 COMPREHEN METABOLIC PANEL: CPT

## 2018-04-08 PROCEDURE — 84134 ASSAY OF PREALBUMIN: CPT

## 2018-04-08 PROCEDURE — 96366 THER/PROPH/DIAG IV INF ADDON: CPT

## 2018-04-08 PROCEDURE — 81003 URINALYSIS AUTO W/O SCOPE: CPT

## 2018-04-08 PROCEDURE — 70551 MRI BRAIN STEM W/O DYE: CPT

## 2018-04-08 PROCEDURE — 93005 ELECTROCARDIOGRAM TRACING: CPT

## 2018-04-08 PROCEDURE — A4216 STERILE WATER/SALINE, 10 ML: HCPCS

## 2018-04-08 PROCEDURE — 84484 ASSAY OF TROPONIN QUANT: CPT

## 2018-04-08 PROCEDURE — 96376 TX/PRO/DX INJ SAME DRUG ADON: CPT

## 2018-04-08 PROCEDURE — 97139 UNLISTED THERAPEUTIC PX: CPT

## 2018-04-08 PROCEDURE — 99285 EMERGENCY DEPT VISIT HI MDM: CPT

## 2018-04-08 PROCEDURE — 81015 MICROSCOPIC EXAM OF URINE: CPT

## 2018-04-08 PROCEDURE — 97110 THERAPEUTIC EXERCISES: CPT

## 2018-04-08 PROCEDURE — 80061 LIPID PANEL: CPT

## 2018-04-08 PROCEDURE — A9500 TC99M SESTAMIBI: HCPCS

## 2018-04-08 PROCEDURE — 80048 BASIC METABOLIC PNL TOTAL CA: CPT

## 2018-04-08 PROCEDURE — 93017 CV STRESS TEST TRACING ONLY: CPT

## 2018-04-08 PROCEDURE — 72220 X-RAY EXAM SACRUM TAILBONE: CPT

## 2018-04-08 PROCEDURE — 78452 HT MUSCLE IMAGE SPECT MULT: CPT

## 2018-04-08 PROCEDURE — 96365 THER/PROPH/DIAG IV INF INIT: CPT

## 2018-04-08 PROCEDURE — 82550 ASSAY OF CK (CPK): CPT

## 2018-04-08 RX ADMIN — CEFTRIAXONE SCH MLS: 1 INJECTION, POWDER, FOR SOLUTION INTRAMUSCULAR; INTRAVENOUS at 11:45

## 2018-04-08 RX ADMIN — BRIMONIDINE TARTRATE SCH DROP: 2 SOLUTION OPHTHALMIC at 21:54

## 2018-04-08 RX ADMIN — VANCOMYCIN HYDROCHLORIDE SCH MLS: 750 INJECTION, POWDER, LYOPHILIZED, FOR SOLUTION INTRAVENOUS at 12:57

## 2018-04-08 NOTE — RAD
SACRUM AND COCCYX 1 VIEW:

 

Date:  04/08/18 

 

HISTORY:  

Fracture. 

 

FINDINGS:

Sacrum is completely obscured by bowel content and contrast material. Postoperative changes of the le
ft hip and left side of the pelvis are apparent. No gross malalignment is evident. Osseous structures
 are demineralized. 

 

IMPRESSION: 

Sacrum not well evaluated due to osteoporosis and bowel content. 

 

 

 

POS: [facility]

## 2018-04-08 NOTE — CON
DATE OF CONSULTATION:  04/08/2018

 

CHIEF COMPLAINT:  Chest pain.

 

HISTORY OF PRESENT ILLNESS:  Ms. Hernandez is a 78-year-old female who was admitted for chest pain.  She is 
having ongoing workup for this by the Internal Medicine Service.  She was noted to have a CT scan lenny
ed from 03/11/2018 which demonstrated sacral insufficiency fractures.  It was unknown if she had been
 treated for this.  Orthopedics was consulted.  She is a poor historian.  She does not complain of ac
tive pain.  She is not in a brace.  She denies being treated for her sacral fractures or seeing a doc
tor for this since the images were taken.  She denies any recent falls.  She is lying in bed.  She re
ports that she can ambulate with a walker.

 

PAST MEDICAL HISTORY:  Hypertension, hyperlipidemia, history of rhabdomyolysis, history of weakness, 
and history of malnutrition.

 

PAST SURGICAL HISTORY:  Hernia repair, appendectomy, left hip fracture surgery as well as pelvic frac
ture fixation.

 

PSYCHIATRIC HISTORY:  Negative.

 

SOCIAL HISTORY:  The patient lives at [facility].  She denies tobacco, alcohol, or drug use.

 

FAMILY MEDICAL HISTORY:  Noncontributory.

 

ALLERGIES:  No known drug allergies.

 

REVIEW OF SYSTEMS:  Limited.  The patient denies any positives on review of systems.

 

PHYSICAL EXAMINATION:

VITAL SIGNS:  Temperature is 97.5, pulse is 73, respiratory rate 16, oxygen saturation 98%, blood pre
ssure 159/72.

GENERAL:  The patient is lying supine.  She shakes her head yes or no upon questioning, but does not 
speak.

HEENT:  Normocephalic, atraumatic.

RESPIRATORY:  Breathing comfortably.

ABDOMEN:  Soft, nontender, nondistended.

MUSCULOSKELETAL:  The patient is able to flex and extend her feet and ankles.  She has palpable dorsa
lis pedis pulses.  She reports feeling light touch at the dorsal and plantar feet.  She moves her kne
es spontaneously.  She moves her hips without significant pain.  She does have pain to palpation minal
g the lumbar and sacral area of her spine.

 

IMAGES:  X-rays of the pelvis as well as CT scan of the pelvis and pelvis MRI are reviewed.  These de
monstrate especially on the CT scan insufficiency fractures of the S1 and S2 sacrum.  These are minim
ally displaced.  There is some anterior listhesis of S1 on her lateral view.  These appear chronic.

 

IMPRESSION:  Sacral insufficiency fractures in an elderly female now admitted for chest pain and alte
red mental status.

 

PLAN:  At this point, I would recommend no specific treatment for her insufficiency fractures.  These
 will generally heal without intervention.  This would not be amenable to kyphoplasty or bracing.  Lillie jorgensen should continue walker, but cannot weightbear as tolerated.  If she has ongoing difficulties after 
several months of nonoperative treatment, we could consider stabilization with sacroiliac screws, alt
raul this would be highly unlikely.  The patient would be a poor surgical candidate.  I will order a
 new sacral x-ray to evaluate the position of her sacrum as well as healing.  We will follow up on th
is.

## 2018-04-08 NOTE — HP
DATE OF ADMISSION:  04/08/2018

 

CHIEF COMPLAINT:  Chest pain and altered mental status.

 

HISTORY OF PRESENT ILLNESS:  Ms. Hernandez is a 78-year-old [place] female with past medical history of hyp
ertension and dyslipidemia who was last admitted to our facility in 03/2018 for statin-induced rhabdo
myolysis, came back from the rehab today for the above-mentioned complaint.  History is mainly obtain
ed by the patient herself who is a very poor historian, and supplemented by the electronic medical re
cords.

 

Ms. Hernandez has been in [facility] completing her rehabilitation after her last hospitalization.  Wh
en prompted, she did say yes that she did have some chest pain.  She describes it as 7/10 in intensit
y, not very sharp pain associated with some numbness, tingling and/or radiation to left arm.  She say
s that yes, she was somewhat short of breath with this as well.  Other than that, she is not able to 
provide me much history.  According to the ER notes, she was given nitroglycerin x3, but continued to
 complain of the chest pain.  She was hypertensive, but the nitroglycerin helped improve her blood pr
essure.  She was sent to our facility for further workup.

 

In the emergency room, her blood pressure was 140/81 at the time of presentation.  Her 12-lead EKG ha
d no changes consistent with ACS.  Chest x-ray was negative.  Cardiac enzymes were negative.  She was
 given 325 mg of aspirin and Internal Medicine team has been asked to admit her for chest pain workup
 and rule out acute coronary syndrome.

 

By reviewing her chart, it seems like she has some imaging studies done in the outpatient setting on 
03/11/2018 and 03/16/2018 including a lumbar spinal CT and MRI.  Both of these are consistent with S1
, S2 displaced fractures as well as extensive sacral ala fractures.  I am not sure if the patient has
 seen Orthopedics in the rehabilitation or if this has been addressed as there is no record.  The guzman woodruff is not able to tell me that either.  Currently, she is not having any back brace per se.  When a
sked if she is able to do the rehabilitation, she is not able to answer this question.

 

PAST MEDICAL HISTORY:

1.  Hypertension.

2.  Dyslipidemia.

3.  Statin-induced rhabdomyolysis.

4.  Generalized weakness.

5.  Severe protein calorie malnutrition with a BMI of 15.

 

PAST SURGICAL HISTORY:

1.  Mesh removed from intestine.

2.  Appendectomy.

3.  Left hip surgery.

 

PSYCHIATRIC HISTORY:  Noted as no anxiety or depression, but the last 2 times I have taken care of th
is patient, she does appear depressed to me.

 

SOCIAL HISTORY:  She is currently a resident of [place] doing rehabilitation.  She has not been home si
nce her last discharge from the hospital about a month ago.

 

FAMILY HISTORY:  Unknown type of cancer in her father.  No history of premature coronary artery disea
se, stroke, diabetes or hypertension.

 

ALLERGIES:  No known medication allergies.

 

CURRENT HOME MEDICATIONS:  As per the [facility] records, trazodone 100 mg at bedtime, ciprofloxacin 250 m
g p.o. b.i.d., Tylenol with Codeine #3 as needed; Tylenol as needed, tramadol 50 mg p.o. b.i.d. and 1
00 mg daily; Artificial Tears, simethicone as needed, senna p.r.n., MiraLax p.r.n., nitroglycerin p.r
.n. sublingual, melatonin 3 mg at bedtime, Milk of Magnesia p.r.n., Catapres p.r.n., calcium, Tums, b
enzonatate, Benadryl p.r.n., etc.

 

REVIEW OF SYSTEMS:  Limited review of systems because the patient is a little bit confused and is robyn
y soft spoken and does not talk much.  At this time, she denies any discomfort, pain, shortness of br
eath.  She denies any nausea, vomiting, diarrhea, abdominal pain.  She denies any headache, vision ch
anges or muscle weakness.  Her only complaint at this time is pain in her hip and back of her legs.

 

PHYSICAL EXAMINATION:

VITAL SIGNS:  Most recent vital signs, temperature 97.6, pulse of 74, respirations 16, saturating 97%
 on room air, blood pressure 165/75.

GENERAL:  No acute distress, awake and alert.  Oriented to self, but had to be redirected about the p
lace and person.  She appears severely cachectic as during her prior admission as well.

HEENT:  Mucous membranes are slightly dry.  No oropharyngeal exudate or erythema.  Head is normocepha
lic, atraumatic.  Pupils are equal, reactive to light and accommodation.

NECK:  Supple without any lymphadenopathy, JVD or bruit.

CHEST:  Clear to auscultation without any wheezing, rales or rhonchi.

HEART:  Rate and rhythm is regular without any murmur, rubs or gallops.

ABDOMEN:  Soft, nontender, nondistended, positive bowel sounds.

EXTREMITIES:  Free of any cyanosis, clubbing, or edema.

NEUROLOGIC:  Unremarkable.  She is able to move both of the lower extremities without any difficulty,
 though she complains of some pain in the right leg with movement.

PSYCHIATRIC:  Depressed affect.

SKIN:  Free of any rashes or bruises.  Feels warm and dry to touch.

EXTREMITIES:  Free of any cyanosis, clubbing, or edema.

 

LABORATORY DATA:  Her CBC shows hemoglobin of 11.4, otherwise unremarkable.  Serum chemistries show s
odium 133, chloride 96, BUN 26, blood sugar 113.  Troponin is less than 0.019 and then less than 0.01
0 x2.  Her triglyceride is 96, total cholesterol 284, , HDL 66.  Urinalysis show moderate leuk
ocyte esterase, wbc's and bacteria and some trace blood.  Chest x-ray by my review has no evidence to
 suggest pulmonary edema, effusion or infiltrate.  Twelve lead EKG unremarkable showing normal sinus 
rhythm.

 

IMPRESSION AND PLAN:

1.  Chest pain.  The patient does have history of hypertension and dyslipidemia.  At this time, we wi
ll start her on low dose amlodipine to control the blood pressure.  She has statin intolerance and we
 will avoid any statins at this time.  We will go ahead and order a stress test for this patient.  Th
e patient has consented for it.  Continue aspirin meanwhile.

2.  Urinary tract infection.  Looking back in her EMR, there is a urine culture dated from 03/10/2018
.  It is positive for Morganella as well as Enterococcus.  Unfortunately, both of these bacteria have
 different antibiotic sensitivity profile.  Based on this, we will start her on vancomycin and Roceph
in until new urine culture has been available, one is being sent.

3.  Hyponatremia and hypochloremia, most likely dehydration.  Because of the high blood pressure at t
his time, we will avoid starting her on IV fluids.  We will encourage oral intake and add plenty of f
luids as well as Ensure 3 times a day.  If her oral intake is not adequate, we will go ahead and star
t her on normal saline.

4.  Sacral fractures.  I am not sure what has been done about these and chronicity or acuity of the n
ature of these fractures.  We will request consultation with Orthopedics for possible back brace vers
us kyphoplasty if this is amenable to kyphoplasty.  The patient's rehab is greatly hampered because o
f these fractures as she is always in pain, using a lot of pain medications around the clock.

5.  Severe malnutrition, we will continue the Ensure t.i.d. while she is here.  Encourage oral intake
.

6.  Generalized weakness.  We will continue her rehabilitation while she is here with Occupational th
erapist and physical therapist.

7.  Recent rhabdomyolysis.  Continue to avoid statins and related medications.  Her CPK at this time 
is within normal limits.

8.  Code status:  FULL CODE.  Discussed with the patient.

9.  Add p.r.n. medication orders and supportive care.

 

DISPOSITION:  Ms. Hernandez is currently being admitted for chest pain workup.  She also seems to have a uri
nary tract infection which will be treated simultaneously.  Further management will depend upon her c
linical course.  Currently, she is being admitted under observation status.  Please note that she conchis
l return back to [place] if her cardiac workup is negative.

## 2018-04-08 NOTE — RAD
CHEST 1 VIEW:

 

Date:  04/08/18 

 

HISTORY:  

Chest pain. 

 

COMPARISON:  

03/04/18. 

 

FINDINGS:

Cardiac silhouette is magnified by projection. Pulmonary vasculature is unremarkable. Lungs are hyper
inflated. Mediastinum is midline with aortic calcification. No lobar consolidation or evidence of pne
umothorax. 

 

IMPRESSION: 

1  COPD. 

2.  Atherosclerosis. 

 

 

POS: [facility]

## 2018-04-09 LAB
ANION GAP SERPL CALC-SCNC: 11 MMOL/L (ref 10–20)
BASOPHILS # BLD AUTO: 0 THOU/UL (ref 0–0.2)
BASOPHILS NFR BLD AUTO: 0.4 % (ref 0–1)
BUN SERPL-MCNC: 21 MG/DL (ref 9.8–20.1)
CALCIUM SERPL-MCNC: 9.3 MG/DL (ref 7.8–10.44)
CHLORIDE SERPL-SCNC: 96 MMOL/L (ref 98–107)
CO2 SERPL-SCNC: 29 MMOL/L (ref 23–31)
CREAT CL PREDICTED SERPL C-G-VRATE: 45 ML/MIN (ref 70–130)
EOSINOPHIL # BLD AUTO: 0.1 THOU/UL (ref 0–0.7)
EOSINOPHIL NFR BLD AUTO: 1 % (ref 0–10)
GLUCOSE SERPL-MCNC: 104 MG/DL (ref 83–110)
HGB BLD-MCNC: 11.3 G/DL (ref 12–16)
LYMPHOCYTES # BLD: 1.1 THOU/UL (ref 1.2–3.4)
LYMPHOCYTES NFR BLD AUTO: 16.2 % (ref 21–51)
MCH RBC QN AUTO: 32.4 PG (ref 27–31)
MCV RBC AUTO: 95.7 FL (ref 81–99)
MONOCYTES # BLD AUTO: 0.6 THOU/UL (ref 0.11–0.59)
MONOCYTES NFR BLD AUTO: 8.4 % (ref 0–10)
NEUTROPHILS # BLD AUTO: 4.9 THOU/UL (ref 1.4–6.5)
NEUTROPHILS NFR BLD AUTO: 73.9 % (ref 42–75)
PLATELET # BLD AUTO: 272 THOU/UL (ref 130–400)
POTASSIUM SERPL-SCNC: 3.6 MMOL/L (ref 3.5–5.1)
RBC # BLD AUTO: 3.49 MILL/UL (ref 4.2–5.4)
SODIUM SERPL-SCNC: 132 MMOL/L (ref 136–145)
WBC # BLD AUTO: 6.6 THOU/UL (ref 4.8–10.8)

## 2018-04-09 RX ADMIN — VANCOMYCIN HYDROCHLORIDE SCH MLS: 750 INJECTION, POWDER, LYOPHILIZED, FOR SOLUTION INTRAVENOUS at 11:29

## 2018-04-09 RX ADMIN — BRIMONIDINE TARTRATE SCH DROP: 2 SOLUTION OPHTHALMIC at 08:46

## 2018-04-09 RX ADMIN — MULTIPLE VITAMINS W/ MINERALS TAB SCH: TAB at 11:30

## 2018-04-09 RX ADMIN — BRIMONIDINE TARTRATE SCH DROP: 2 SOLUTION OPHTHALMIC at 19:36

## 2018-04-09 RX ADMIN — ACETAMINOPHEN AND CODEINE PHOSPHATE PRN TAB: 300; 30 TABLET ORAL at 23:40

## 2018-04-09 RX ADMIN — CEFTRIAXONE SCH MLS: 1 INJECTION, POWDER, FOR SOLUTION INTRAMUSCULAR; INTRAVENOUS at 11:22

## 2018-04-09 NOTE — CT
CT BRAIN WITHOUT CONTRAST:

 

History: Altered mental status. Lethargy and dizziness for several days. 

 

FINDINGS: 

Comparison made with exam of 2-14-14. 

 

No evidence of acute infarct, hemorrhage, midline shift, or abnormal extraaxial fluid collections are
 seen. The ventricular size is stable and the basilar cisterns are patent. The bony calvarium is inta
ct. The visualized paranasal sinuses and mastoid air cells are well aerated. 

 

IMPRESSION: 

No CT evidence of acute intracranial process. 

 

POS: [facility]

## 2018-04-09 NOTE — PDOC.PN
- Subjective


Encounter Start Date: 04/09/18


Encounter Start Time: 10:00





Patient is seen today along with Daughter at Bedside, She has Worseing mental 

Status According to daughter since 3-4 weeks following her hospitalization for 

Rhabdomyolysis and Sacral fractures, She is requesting a neurology  

consultation.





- Objective


MAR Reviewed: Yes


Vital Signs & Weight: 


 Vital Signs (12 hours)











  Temp Pulse Pulse Pulse Resp BP BP


 


 04/09/18 11:30   69     111/63 


 


 04/09/18 10:30    70  80    119/61


 


 04/09/18 08:57  97.8 F  72    16  


 


 04/09/18 08:15    73  74    137/68


 


 04/09/18 07:13  97.8 F  72    16  


 


 04/09/18 04:40  97.4 F L  76    20  














  BP BP Pulse Ox


 


 04/09/18 11:30   


 


 04/09/18 10:30  127/60  


 


 04/09/18 08:57   


 


 04/09/18 08:15  167/74 H  


 


 04/09/18 07:13   107/59 L  94 L


 


 04/09/18 04:40   128/57 L  97








 Weight











Admit Weight                   88 lb


 


Weight                         88 lb














I&O: 


 











 04/08/18 04/09/18 04/10/18





 06:59 06:59 06:59


 


Intake Total  680 


 


Output Total  2250 


 


Balance  -1570 











Result Diagrams: 


 04/09/18 03:49





 04/09/18 03:50


Radiology Reviewed by me: Yes





Dx/Plan


(1) Hyponatremia


Code(s): E87.1 - HYPO-OSMOLALITY AND HYPONATREMIA   Status: Acute   Comment: 

Likely from opiodes., will closley monitor on IV NS.   





(2) Acute encephalopathy


Code(s): G93.40 - ENCEPHALOPATHY, UNSPECIFIED   Status: Acute   Comment: 

Multifactorial, likely from UTi with drug resistant enterococcus, but No 

leucocytosis, or likely from hyponatrmeia., Will need to look for Intracraneal 

bleeding, will do CT head, as pt requesting Neurolgy consult , will consult.   





(3) Rhabdomyolysis


Code(s): M62.82 - RHABDOMYOLYSIS   Status: Acute   


Qualifiers: 


   Rhabdomyolysis type: non-traumatic   Qualified Code(s): M62.82 - 

Rhabdomyolysis   


Comment: Persistant pain, Will closley monitor. No acute Rhabdomyolysis.    





(4) Moderate protein-calorie malnutrition


Code(s): E44.0 - MODERATE PROTEIN-CALORIE MALNUTRITION   Status: Chronic   

Comment: Will consult nutrition, Will do prealbumin.   





(5) ACS (acute coronary syndrome)


Code(s): I24.9 - ACUTE ISCHEMIC HEART DISEASE, UNSPECIFIED   Status: Acute   

Comment: Normal Trop, Will continue with Aspirin/ BB, pending nuclear stress 

test.   





(6) Acute UTI


Code(s): N39.0 - URINARY TRACT INFECTION, SITE NOT SPECIFIED   Status: Acute   

Comment: Will continue with IV vancomycin and Rocephin.  pt has indwelling 

cather infection, Likely colonizer bacteria. Pt planned to see urology 

outpaitent for Chronic Retention.   





- Plan


cont current plan of care, plan discussed w/ family, PT/OT, , 

respiratory therapy, incentive spirometry, DVT proph w/lovenox





* .








- Discharge Day


Encounter end time: 10:35





Review of Systems





- Review of Systems


Eyes: negative: Pain, Vision Change, Conjunctivae Inflammation, Eyelid 

Inflammation, Redness, Other


ENT: negative: Ear Pain, Ear Discharge, Nose Pain, Nose Discharge, Nose 

Congestion, Mouth Pain, Mouth Swelling, Throat Pain, Throat Swelling, Other


Respiratory: negative: Cough, Dry, Shortness of Breath, Hemoptysis, SOB with 

Excertion, Pleuritic Pain, Sputum, Wheezing


Cardiovascular: negative: chest pain, palpitations, orthopnea, paroxysmal 

nocturnal dyspnea, edema, light headedness, other


Gastrointestinal: negative: Nausea, Vomiting, Abdominal Pain, Diarrhea, 

Constipation, Melena, Hematochezia, Other


Musculoskeletal: negative: Neck Pain, Shoulder Pain, Arm Pain, Back Pain, Hand 

Pain, Leg Pain, Foot Pain, Other


Skin: negative: Rash, Lesions, Jamie, Bruising, Other


Other: 





Unable to obtain any historty due to mental status changes.





- Medications/Allergies


Allergies/Adverse Reactions: 


 Allergies











Allergy/AdvReac Type Severity Reaction Status Date / Time


 


No Known Allergies Allergy   Unverified 03/04/18 15:23











Medications: 


 Current Medications





Acetaminophen (Tylenol)  650 mg PO Q4H PRN


   PRN Reason: Headache/Fever or Pain


Acetaminophen/Codeine Phosphate (Tylenol #3)  1 tab PO Q4H PRN


   PRN Reason: Pain


Acetaminophen/Codeine Phosphate (Tylenol #3)  2 tab PO Q4HR PRN


   PRN Reason: Pain


Hydrocodone Bitart/Acetaminophen (Norco 5/325)  1 tab PO Q4H PRN


   PRN Reason: Moderate Pain (4-6)


Al Hydroxide/Mg Hydroxide (Maalox)  30 ml PO Q6H PRN


   PRN Reason: Heartburn  or Indigestion


Amlodipine Besylate (Norvasc)  5 mg PO DAILY [facility]


   Last Admin: 04/09/18 11:30 Dose:  Not Given


Artificial Tears (Tears Naturale)  1 drop EA EYE BIDPRN PRN


   PRN Reason: Dry Eyes


Aspirin (Aspirin)  325 mg PO DAILY [facility]


   Last Admin: 04/09/18 11:29 Dose:  325 mg


Benzonatate (Tessalon)  100 mg PO Q4H PRN


   PRN Reason: Cough


Benzonatate (Tessalon)  100 mg PO Q4H PRN


   PRN Reason: Cough


Bisacodyl (Dulcolax)  10 mg PO DAILYPRN PRN


   PRN Reason: Constipation


Bisacodyl (Dulcolax)  10 mg CO DAILYPRN PRN


   PRN Reason: Constipation


Brimonidine Tartrate (Alphagan 0.2% Ophth Soln)  0 drop L EYE BID [facility]


   Last Admin: 04/09/18 08:46 Dose:  1 drop


Calcium Carbonate (Tums)  1,000 mg PO Q4H PRN


   PRN Reason: Heartburn  or Indigestion


Calcium Carbonate (Oscal-500)  1,000 mg PO Q6HR PRN


   PRN Reason: Heartburn  or Indigestion


Calcium Carbonate (Tums)  500 mg PO Q6HR PRN


   PRN Reason: Heartburn  or Indigestion


Clonidine (Catapres)  0.1 mg PO Q4H PRN


   PRN Reason: Systolic BP > 160


Enoxaparin Sodium (Lovenox)  40 mg SC 0900 [facility]


   Last Admin: 04/09/18 08:46 Dose:  40 mg


Guaifenesin (Robitussin Sf)  200 mg PO Q4H PRN


   PRN Reason: Cough


Hydralazine HCl (Apresoline)  10 mg SLOW IVP Q4H PRN


   PRN Reason: Systolic BP > 170


Ceftriaxone Sodium 1 gm/ (Syringe 0.4 ml/ Sterile Water)  10 mls @ 120 mls/hr 

SLOW IVP 1100 [facility]


   Last Admin: 04/09/18 11:22 Dose:  10 mls


Vancomycin HCl 750 mg/ Sodium (Chloride)  250 mls @ 250 mls/hr IVPB 1200 [facility]


   Last Admin: 04/09/18 11:29 Dose:  250 mls


Iron/Minerals/Multivitamins (Theragran M)  1 tab PO DAILY [facility]


   Last Admin: 04/09/18 11:30 Dose:  Not Given


Loratadine (Claritin)  10 mg PO DAILYPRN PRN


   PRN Reason: Sinus Symptoms


Lorazepam (Ativan)  1 mg PO Q4H PRN


   PRN Reason: Anxiety/Agitation


Melatonin (Melatonin)  3 mg PO [facility]


   Last Admin: 04/08/18 21:54 Dose:  3 mg


Miscellaneous Medication (Pharmacy To Dose)  0 each IVPB DAILYPRN PRN


   PRN Reason: LAB


Morphine Sulfate (Morphine)  2 mg SLOW IVP Q4H PRN


   PRN Reason: moderate pain


Nitroglycerin (Nitrostat)  0.4 mg PO Q5MIN PRN


   PRN Reason: Chest Pain


Ondansetron HCl (Zofran)  4 mg IVP Q6H PRN


   PRN Reason: Nausea/Vomiting


   Last Admin: 04/09/18 14:15 Dose:  4 mg


Senna (Senokot)  2 tab PO HSPRN PRN


   PRN Reason: Constipation


   Last Admin: 04/08/18 15:22 Dose:  2 tab


Simethicone (Mylicon Chewable)  80 mg PO TIDPRN PRN


   PRN Reason: Heartburn  or Indigestion


Tramadol HCl (Ultram)  50 mg PO Q4H PRN


   PRN Reason: Moderate Pain (4-6)


   Last Admin: 04/09/18 08:45 Dose:  50 mg


Trazodone HCl (Desyrel)  100 mg PO [facility]


   Last Admin: 04/08/18 21:54 Dose:  100 mg

## 2018-04-09 NOTE — NM
NUCLEAR MEDICINE CARDIAC STRESS WITH EF AND WALL MOTION:

 

HISTORY:

Chest pain.

 

COMPARISON:

None.

 

TECHNIQUE:

The patient was administered 9 millicuries of technetium 99m sestamibi for rest imaging and 27 millic
uries of technetium 99m sestamibi for stress imaging.  Cardiac gating was performed.

 

FINDINGS:

There was a homogeneous distribution of the radiotracer in the left ventricular on the attenuation co
rrection images.

 

TID is 0.74.

 

End-diastolic volume is 50 mL.  End-systolic volume is 14 mL.

 

CARDIAC GATING:

Normal motion and thickening.  Ejection fraction 72%.

 

IMPRESSION:

1.  No reversibility or fixed defect.

2.  Ejection fraction 72%.

 

POS: [facility]

## 2018-04-10 VITALS — TEMPERATURE: 98 F | DIASTOLIC BLOOD PRESSURE: 61 MMHG | SYSTOLIC BLOOD PRESSURE: 121 MMHG

## 2018-04-10 LAB — VANCOMYCIN TROUGH SERPL-MCNC: 6.1 UG/ML

## 2018-04-10 RX ADMIN — BRIMONIDINE TARTRATE SCH DROP: 2 SOLUTION OPHTHALMIC at 08:40

## 2018-04-10 RX ADMIN — CEFTRIAXONE SCH MLS: 1 INJECTION, POWDER, FOR SOLUTION INTRAMUSCULAR; INTRAVENOUS at 12:30

## 2018-04-10 RX ADMIN — MULTIPLE VITAMINS W/ MINERALS TAB SCH TAB: TAB at 08:40

## 2018-04-10 RX ADMIN — ACETAMINOPHEN AND CODEINE PHOSPHATE PRN TAB: 300; 30 TABLET ORAL at 03:35

## 2018-04-10 NOTE — CON
DATE OF CONSULTATION:  04/09/2018

 

REFERRING PHYSICIAN:  Dr. Navid Friend.

 

REASON FOR CONSULTATION:  Altered mental status.

 

HISTORY OF PRESENT ILLNESS:  Ms. Hernandez is a pleasant 78-year-old  female who has been consulted for
 evaluation of altered mental status.  History is obtained from daughter who was present at bedside. 
 Daughter reports that prior to 03/02/2018, the patient had developed flu from which she recovered.  
She had presented on 03/02/2018 with some difficulty with memory. She also started having pain in her
 lower back.  At that time, she was diagnosed with rhabdomyolysis as her CPK was mildly elevated.  Sh
e was noted that she was acting in somewhat odd behavior.  She was having difficulty with remembering
 conversations.  She was having difficulty with remembering how to perform simple activities.  This c
ontinued to get worse over the time.  She continued to have pain in the lower back.  She had presente
d back again to the [facility] and was found to have a sacral fracture.  She was in a rehab 
facility where she was noted to have increasing confusion, which prompted them to present to the [facility].  Daughter reports that her confusion has gotten worse to the point that she ca
nnot remember the activities that she is already performed.  She eats her meal and an hour later she 
forgets that what she ate.  She would have a conversation with her and few minutes later she would as
k the same question over.  She is still able to recognize family members.  She does not have any villarreal
ucinations or delusions.  There is no day night confusion.

 

PAST MEDICAL HISTORY:  Significant for hypertension, dyslipidemia, statin-induced rhabdomyolysis, sev
ere protein-calorie malnutrition.

 

PAST SURGICAL HISTORY:  Significant for mesh removed from intestine, appendectomy, left hip surgery.

 

SOCIAL HISTORY:  She is a resident of [facility] doing rehabilitation.  She does not smoke ciga
rettes, drink alcohol or use illicit drugs.

 

FAMILY HISTORY:  Noncontributory.

 

CURRENT MEDICATIONS:  Please review MAR.

 

ALLERGIES:  No known drug allergies.

 

REVIEW OF SYSTEMS:  Unable to perform.

 

PHYSICAL EXAMINATION:

VITAL SIGNS:  Blood pressure 143/65, pulse of 82, temperature of 98.2, respirations of 16, O2 sats of
 95% on room air.

GENERAL:  Well-developed, well-nourished  female, in no apparent distress.

RESPIRATORY:  Clear to auscultation bilaterally.

CARDIOVASCULAR:  Regular rate and rhythm.

NEUROLOGIC:  Mental status:  The patient is awake, alert, oriented x3.  Speech and language:  Fluent 
speech.  Cranial nerves:  Pupils are 3 mm and reactive.  Visual fields are intact.  External muscles 
are intact.  No nystagmus is noted.  Face is symmetric.  Tongue and uvula are midline.  Motor exam sh
owed normal tone and bulk with a 5/5 strength in both upper and lower extremities.  Sensory:  Sensati
on is intact and symmetric.  Deep tendon reflexes 2+ reflexes in both upper and lower extremities.  B
abinski:  Plantar responses flexion bilaterally.  Coordination intact to finger-nose-finger and finge
r tapping bilaterally.

 

LABORATORY DATA:  Reviewed, which included CBC, CMP, lipid profile, urinalysis, which is significant 
for hemoglobin 11.3, hematocrit of 33.4.  Sodium of 132, total cholesterol of 284, LDL of 199, HDL of
 66, triglycerides of 96.  Urinalysis showed greater than 50 to too numerous to count wbc's, 2+ bacte
kathleen.  Moderate leukocyte esterase.  Urine culture was positive for Enterococcus species.

 

IMAGING STUDIES:  CT head without contrast was reviewed, which showed no acute intracranial  abnormal
ity.

 

IMPRESSION:

1.  Altered mental status, likely toxic metabolic encephalopathy.

2.  Urinary tract infection.

3.  Sacral fractures.

 

ASSESSMENT AND PLAN:  Ms. Hernandez is a pleasant 78-year-old  female, who presents with the changes in
 confusion and mentation.  She is noted to have a urinary tract infection.  She is also on 2 differen
t pain medications, which can cause confusion to come on.   At this time, I would recommend obtaining
 MRI brain without contrast.  If this does not show any structural lesion, then the patient is record
ed medically for encephalopathic changes.  No further neurological workup needed at this time.

## 2018-04-10 NOTE — MRI
NONCONTRAST HUMBLE MRI:

 

HISTORY: 

Generalized weakness.  History of fall.  Sacral fracture.  Altered mental status.

 

COMPARISON: 

None.

 

TECHNIQUE: 

A brain MRI is performed without intravenous Gadolinium administration.  Multisequential, multiplanar
 imaging is performed.

 

FINDINGS: 

Limited evaluation due to motion degradation.  No obvious hemorrhage on the axial gradient echo seque
nce.

 

No parenchymal mass, mass effect, or midline shift.  Brain volume is age appropriate.  Cortical gray-
white matter differentiation is preserved.

 

The ventricles and sulci are patent and symmetric.

 

The calvarium has a normal T1 marrow signal intensity.  Midline brain parenchymal structures are unre
markable.

 

Central arterial flow voids are maintained.  Absent restricted diffusion.

 

Mucosal thickening of the paranasal sinuses is noted.  Adequate mastoid air cell aeration.

 

No hemorrhage on the axial gradient echo sequence.

 

T2 and FLAIR white matter hyperintensities due to chronic small-vessel ischemic changes are noted.

 

IMPRESSION: 

1.  Limited evaluation due to motion degradation.  Absent restricted diffusion.  No acute infarct.

 

2.  Age-appropriate atrophy.  Chronic small-vessel ischemic changes of the white matter are identifie
d.

 

POS: [facility]

## 2018-04-11 NOTE — DIS
DATE OF ADMISSION:  04/08/2018

 

DATE OF DISCHARGE:  04/10/2018

 

DISCHARGE DIAGNOSES:

1.  Chest pain, non-cardiac.

2.  Urinary tract infection with Enterococcus species.

3.  Hyponatremia, mild.

4.  Acute metabolic encephalopathy.

5.  History of sacral fractures.

6.  Generalized weakness.

 

CONSULTATIONS:  Dr. Erin Pierre with Neurology Service.  Dr. Hatfiedl with Orthopedic Surgery Service
.

 

PERTINENT LAB AND X-RAY FINDINGS:  Sodium ranged between 132-133.  Troponin I negative x3.  Total cho
lesterol 284, triglycerides 96, HDL 66, .  CBC showed hemoglobin of 11, hematocrit 33.  Urine 
culture dated 04/08/2018 showed greater than 100,000 colonies of Enterococcus species.  Portable ches
t x-ray dated 04/08/2018 showed chronic changes bilaterally.  No acute infiltrate identified.  Sacrum
 and coccyx radiograph dated 04/08/2018 showed poor visualization of the sacrum due to overlying yaquelin
l gas.  Cardiolite stress test dated 04/09/2018 showed no evidence for reversible or fixed ischemia w
ith calculated ejection fraction of 72%.  CT of the brain without contrast dated 04/09/2018 showed no
 acute intracranial process.  MRI of the brain dated 04/10/2018 showed no acute intracranial process.
  Motion artifact.  Chronic ischemic white matter changes noted.

 

HOSPITAL COURSE:  The patient was initially admitted after presenting from [facility] unit with complaints of chest pain.  The patient underwent serial cardiac enzymes which were n
egative x3 proceeding to Cardiolite stress testing showing no evidence of reversible or fixed ischemi
a with overall calculated ejection fraction of 72%.  The patient was also diagnosed with urinary trac
t infection with Enterococcus species placed on IV Rocephin and vancomycin.  The patient was transiti
oned to oral Augmentin to complete outpatient antibiotic therapy.  The patient was also noted with en
cephalopathic changes, undergoing extensive neuro imaging as well as consultation with Neurology Serv
ice without specific recommendations for intervention as the patient's encephalopathy, likely metabol
ic in nature.  The patient received treatment for the underlying urinary tract infection as well as g
eneral supportive measures with some improvement in overall mentation prior to discharge.  The patien
t did receive IV fluids due to mild hyponatremia and was resumed on her regular outpatient antihypert
ensive regimen.  Currently, the patient is recommended for outpatient Urology followup to discuss and
 plan for eventual discontinuation of indwelling Calixto catheter.  Overall, patient did remain clinica
lly stable for the remainder of the hospital course.  Telemetry monitoring showed sinus mechanism wit
hout evidence of acute arrhythmia or dysrhythmia.  I have discussed the pertinent lab x-ray and neuro
 imaging studies with the patient and her daughter as well as plans for outpatient followup and retur
n to [facility] in [place], [place].  Daughter and patient verbalized agreement and will be dischar
ge on 04/10/2018.

 

DISCHARGE MEDICATIONS:

1.  Augmentin 500 mg 1 tab p.o. b.i.d. x5 days.

2.  Tylenol 500 mg p.o. q.4-6 hours p.r.n.

3.  Tylenol No. 3 one to two tabs p.o. q.4-6 hours p.r.n.

4.  Norvasc 5 mg 1 tablet p.o. daily.

5.  Tessalon 100 mg p.o. q.4 hours p.r.n.

6.  Brimonidine tartrate 0.1% ophthalmic 1 drop to left eye b.i.d.

7.  Calcium carbonate 500 mg 1-2 tabs p.o. q.6 hours p.r.n.

8.  Clonidine 0.1 mg p.o. q.6 hours p.r.n. systolic over 180.

9.  Melatonin 3 mg p.o. at bedtime.

10.  Multivitamin 1 tab p.o. daily.

11.  Senna 2 tabs p.o. at bedtime.

12.  Tramadol 50 mg p.o. b.i.d. p.r.n.

13.  Trazodone 100 mg p.o. at bedtime.

 

FOLLOWUP:  The patient may follow up with Dr. Briseida Pierre with Neurology Service within 10 days of cristina solomon.  The patient will follow up with Dr. Georgette Barton within 1 week of discharge.  The gema
ent will follow up with Dr. Guera [place], primary care provider and to call her office for appointment t
anthony and date.

 

CONDITION ON DISCHARGE:  Stable.

 

ACTIVITY:  Ad luan, rolling walker with standby assistance.

 

DIET:  Regular.

 

CODE STATUS:  FULL.

 

DISPOSITION:  Discharged to [place], [place] on 04/10/2018.

## 2018-04-11 NOTE — RAD
MODIFIED BARIUM SWALLOW:

4/11/18

 

HISTORY: 

Swallowing difficulty. 

 

COMPARISON:  

None.

 

FINDINGS:  

Modified barium swallow is performed in the presence of the speech pathologist. The image submitted a
re nondiagnostic as they are subtraction phase images. 

 

IMPRESSION:  

Nondiagnostic images. 

 

POS: [facility]

## 2018-05-29 ENCOUNTER — HOSPITAL ENCOUNTER (EMERGENCY)
Dept: HOSPITAL 92 - ERS | Age: 78
Discharge: HOME | End: 2018-05-29
Payer: MEDICARE
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
## 2018-05-29 DIAGNOSIS — R51: Primary | ICD-10-CM

## 2018-05-29 DIAGNOSIS — E78.5: ICD-10-CM

## 2018-05-29 PROCEDURE — 72125 CT NECK SPINE W/O DYE: CPT

## 2018-05-29 PROCEDURE — 70450 CT HEAD/BRAIN W/O DYE: CPT

## 2018-05-29 NOTE — CT
PRELIMINARY REPORT/[facility]/EMERGENTY AFTER-HOURS PROCEDURE 

 

CT Head Without Intravenous Contrast

 

CLINICAL HISTORY:

78 years old, female; Injury or trauma; Fall; Initial encounter; Abrasion; Not specified; Patient HX:
 [place]

 

TECHNIQUE:

Axial computed tomography images of the head/brain without intravenous contrast.

 

COMPARISON:

No relevant prior studies available.

 

FINDINGS:

Brain: Mild volume loss No hemorrhage. Mild white matter disease. No edema.

Ventricles: Unremarkable. No ventriculomegaly.

Bones/joints: Unremarkable. No acute fracture.

Soft tissues: Unremarkable.

Sinuses: Unremarkable as visualized. No acute sinusitis.

Mastoid air cells: Unremarkable as visualized. No mastoid effusion.

 

IMPRESSION:

No intracranial hemorrhage. Please see discussion above.

 

Thank you for allowing us to participate in the care of your patient.

Dictated and Authenticated by: Demetris Wilson MD

05/29/2018 3:09 AM Central Time ([place] & [place])

 

 

FINAL REPORT 

EMERGENCY AFTER HOURS BRAIN CT WITHOUT IV CONTRAST:

 

Date:  05/29/18

Time:  0256 hours

 

COMPARISON:  

04/09/18. 

 

FINDINGS:

No mass or bleed, or other acute process. There is some motion artifact. 

 

Report in agreement with preliminary report given on-call by [facility]. 

 

 

POS: [facility]

## 2018-05-29 NOTE — CT
PRELIMINARY REPORT/[facility]/EMERGENTY AFTER-HOURS PROCEDURE 

 

CT Cervical Spine Without Intravenous Contrast

 

CLINICAL HISTORY:

78 years old, female; Injury or trauma; Fall; Initial encounter; Blunt trauma; Patient HX: Fell, [place]

 

TECHNIQUE:

Axial computed tomography images of the cervical spine without intravenous contrast.

 

COMPARISON:

No relevant prior studies available.

 

FINDINGS:

Vertebrae: Grossly maintained. No acute fracture.

Discs/spinal canal/neural foramina:No acute findings.No significant spinal canal stenosis.

Soft tissues: Unremarkable.

Lung apices: Unremarkable as visualized.

 

IMPRESSION:

No definite acute cervical fracture

 

Thank you for allowing us to participate in the care of your patient.

Dictated and Authenticated by: Demetris Wilson MD

05/29/2018 3:13 AM Central Time ([place] & [place])

 

 

FINAL REPORT 

CT CERVICAL SPINE WITH CORONAL AND SAGITTAL REFORMATIONS:

 

Date:  05/29/18 

 

FINDINGS/IMPRESSION: 

I agree with the preliminary report given by [place]. 

 

 

POS: [facility]

## 2018-06-01 ENCOUNTER — HOSPITAL ENCOUNTER (OUTPATIENT)
Dept: HOSPITAL 92 - BICRAD | Age: 78
Discharge: HOME | End: 2018-06-01
Attending: INTERNAL MEDICINE
Payer: MEDICARE

## 2018-06-01 DIAGNOSIS — M47.896: Primary | ICD-10-CM

## 2018-06-01 DIAGNOSIS — M85.88: ICD-10-CM

## 2018-06-01 DIAGNOSIS — M47.894: ICD-10-CM

## 2018-06-01 PROCEDURE — 72100 X-RAY EXAM L-S SPINE 2/3 VWS: CPT

## 2018-06-01 PROCEDURE — 72072 X-RAY EXAM THORAC SPINE 3VWS: CPT

## 2018-11-08 ENCOUNTER — HOSPITAL ENCOUNTER (OUTPATIENT)
Dept: HOSPITAL 92 - BICRAD | Age: 78
Discharge: HOME | End: 2018-11-08
Attending: INTERNAL MEDICINE
Payer: MEDICARE

## 2018-11-08 DIAGNOSIS — R10.9: Primary | ICD-10-CM

## 2018-11-08 DIAGNOSIS — M95.5: ICD-10-CM

## 2018-11-08 DIAGNOSIS — R19.5: ICD-10-CM

## 2018-11-08 DIAGNOSIS — Z98.890: ICD-10-CM

## 2018-11-08 DIAGNOSIS — R93.7: ICD-10-CM

## 2018-11-08 PROCEDURE — 74018 RADEX ABDOMEN 1 VIEW: CPT

## 2018-11-08 NOTE — RAD
ABDOMEN ONE VIEW:

11/8/18

 

HISTORY: 

78-year-old female with history of generalized abdominal pain for one to two weeks. 

 

Internal fixation screws stabilize the left ileum. Compression screws stabilizes the left femur. Ther
e is some deformity of the left superior and inferior ischial pubic rami evidence for old injury. The
re is a sclerotic density overlying the right L4 pedicle region probably an isolated bone island. The
re is some gas and fecal material in the colon. No evidence for large or small bowel obstruction or o
vert calculus. 

 

IMPRESSION:  

Unremarkable abdomen. Other findings as above. Stable from a  film from a CT of the lumbar spine
 3/1/18. 

 

POS: [facility]

## 2021-12-14 NOTE — RAD
AP VIEW OF THE PELVIS:

 

INDICATION: 

Flu-like symptoms with leg pain and muscle aches.

 

COMPARISON: 

None.

 

FINDINGS: 

There are percutaneous pins transfixing the left sacroiliac joint.  There is also a sliding hip screw
 and side plate involving the proximal left femur.  No acute fracture is evident.  There is diffuse o
steopenia.

 

IMPRESSION: 

Postoperative pelvis.  No acute osseous abnormality.

 

POS: [facility] Please call: Dilia Araujo (Daughter) PRIOR to surgery!     [phone]